# Patient Record
Sex: MALE | Race: BLACK OR AFRICAN AMERICAN | NOT HISPANIC OR LATINO | Employment: FULL TIME | URBAN - METROPOLITAN AREA
[De-identification: names, ages, dates, MRNs, and addresses within clinical notes are randomized per-mention and may not be internally consistent; named-entity substitution may affect disease eponyms.]

---

## 2019-04-16 ENCOUNTER — HOSPITAL ENCOUNTER (EMERGENCY)
Facility: HOSPITAL | Age: 59
Discharge: HOME | End: 2019-04-16
Attending: EMERGENCY MEDICINE
Payer: COMMERCIAL

## 2019-04-16 VITALS
OXYGEN SATURATION: 98 % | RESPIRATION RATE: 14 BRPM | BODY MASS INDEX: 23.11 KG/M2 | DIASTOLIC BLOOD PRESSURE: 83 MMHG | WEIGHT: 174.38 LBS | SYSTOLIC BLOOD PRESSURE: 136 MMHG | HEIGHT: 73 IN | TEMPERATURE: 98 F | HEART RATE: 51 BPM

## 2019-04-16 DIAGNOSIS — L03.115 CELLULITIS OF RIGHT LOWER LEG: Primary | ICD-10-CM

## 2019-04-16 DIAGNOSIS — M79.661 PAIN OF RIGHT LOWER LEG: ICD-10-CM

## 2019-04-16 PROCEDURE — 99284 EMERGENCY DEPT VISIT MOD MDM: CPT

## 2019-04-16 RX ORDER — DOXYCYCLINE 100 MG/1
100 CAPSULE ORAL 2 TIMES DAILY
Qty: 20 CAPSULE | Refills: 0 | Status: SHIPPED | OUTPATIENT
Start: 2019-04-16 | End: 2019-04-26

## 2019-04-16 NOTE — ED PROVIDER NOTES
SCRIBE #1 NOTE: I, Corinne Mack, am scribing for, and in the presence of, Jose Samuel NP. I have scribed the entire note.      History      Chief Complaint   Patient presents with    Leg Swelling     pt c/o localized swelling and redness to his Rt lower leg that has been going on for a month, pt has been getting treated for cellulitis       Review of patient's allergies indicates:  No Known Allergies     HPI   HPI    4/16/2019, 6:28 PM   History obtained from the patient      History of Present Illness: Hector Corona is a 58 y.o. male patient with PMHx of HTN who presents to the Emergency Department for R lower leg swelling which onset gradually a month ago. Pt reports he is being Tx for a RLE cellulitis; pt has been on clindamycin for 3 weeks. Pt states his RLE swelling has not resolved. Symptoms are constant and moderate in severity. No mitigating or exacerbating factors reported. Associated sxs include R lower leg redness and pain. Patient denies any fever, chills, CP, SOB, N/V/D, abd pain, back pain, neck pain, HA, dizziness, and all other sxs at this time. No other prior Tx reported. No further complaints or concerns at this time.         Arrival mode: Personal vehicle    PCP: [facility]       Past Medical History:  Past Medical History:   Diagnosis Date    Hypertension        Past Surgical History:  History reviewed. No pertinent surgical history.      Family History:  Family History   Problem Relation Age of Onset    Pneumonia Mother     Colon cancer Father         80       Social History:  Social History     Tobacco Use    Smoking status: Never Smoker    Smokeless tobacco: Never Used   Substance and Sexual Activity    Alcohol use: Yes     Alcohol/week: 2.4 oz     Types: 4 Cans of beer per week    Drug use: No    Sexual activity: Yes     Partners: Female     Birth control/protection: Condom       ROS   Review of Systems   Constitutional: Negative for chills and fever.   Respiratory:  "Negative for cough and shortness of breath.    Cardiovascular: Positive for leg swelling (R lower leg). Negative for chest pain.   Gastrointestinal: Negative for abdominal pain, diarrhea, nausea and vomiting.   Musculoskeletal: Negative for back pain, neck pain and neck stiffness.        (+) R lower leg pain   Skin: Negative for rash and wound.        (+) R lower leg redness   Neurological: Negative for dizziness, light-headedness, numbness and headaches.   All other systems reviewed and are negative.    Physical Exam      Initial Vitals [04/16/19 1720]   BP Pulse Resp Temp SpO2   136/83 (!) 51 14 98 °F (36.7 °C) 98 %      MAP       --          Physical Exam  Nursing Notes and Vital Signs Reviewed.  Constitutional: Patient is in no acute distress. Well-developed and well-nourished.  Head: Atraumatic. Normocephalic.  Eyes: PERRL. EOM intact. Conjunctivae are not pale. No scleral icterus.  ENT: Mucous membranes are moist. Oropharynx is clear and symmetric.    Neck: Supple. Full ROM. No lymphadenopathy.  Cardiovascular: Regular rate. Regular rhythm. No murmurs, rubs, or gallops. Distal pulses are 2+ and symmetric.  Pulmonary/Chest: No respiratory distress. Clear to auscultation bilaterally. No wheezing or rales.  Abdominal: Soft and non-distended.  There is no tenderness.  No rebound, guarding, or rigidity.   Musculoskeletal: Moves all extremities. No obvious deformities. No edema. Redness to the medial R lower leg with TTP.  Skin: Warm and dry.  Neurological:  Alert, awake, and appropriate.  Normal speech.  No acute focal neurological deficits are appreciated.  Psychiatric: Normal affect. Good eye contact. Appropriate in content.    ED Course    Procedures  ED Vital Signs:  Vitals:    04/16/19 1720   BP: 136/83   Pulse: (!) 51   Resp: 14   Temp: 98 °F (36.7 °C)   TempSrc: Oral   SpO2: 98%   Weight: 79.1 kg (174 lb 6.1 oz)   Height: 6' 1" (1.854 m)       Abnormal Lab Results:  Labs Reviewed - No data to display     All " Lab Results:  Results for orders placed or performed in visit on 03/10/08   Basic metabolic panel   Result Value Ref Range    BUN, Bld 16 5 - 23 mg/dl    Sodium 142 136 - 145 mMol/l    Potassium 4.0 3.3 - 5.3 mMol/l    Chloride 103 95 - 110 mMol/l    CO2 29 23.0 - 29.0 mEq/L    Glucose 92 70 - 110 mg/dl    Creatinine 1.3 0.5 - 1.4 mg/dl    Calcium 9.4 8.7 - 10.5 mg/dl   Lipid panel   Result Value Ref Range    Cholesterol 174 120 - 199 mg/dL    Triglycerides 74 30 - 150 mg/dL    HDL 53 40 - 64 mg/dL    LDL Cholesterol 106.2 63 - 129 mg/dl    HDL/Chol Ratio 30.5 20 - 50 %    Total Cholesterol/HDL Ratio 3.3 2.0 - 5.0   AST (SGOT)   Result Value Ref Range    AST 15 0 - 37 U/L   ALT (SGPT)   Result Value Ref Range    ALT 13 0 - 40 U/L   TSH   Result Value Ref Range    TSH 2.7 0.4 - 4.0 uIU/ml   PSA, Screening   Result Value Ref Range    PSA, SCREEN 1.5 0 - 4.0 ng/ml       Imaging Results:  Imaging Results          US Lower Extremity Veins Right (Final result)  Result time 04/16/19 20:22:55    Final result by Malcom Sandoval Jr., MD (04/16/19 20:22:55)                 Impression:      Negative for <right>lower extremity DVT.      Electronically signed by: Malcom Sandoval Jr., MD  Date:    04/16/2019  Time:    20:22             Narrative:    EXAMINATION:  US LOWER EXTREMITY VEINS RIGHT    CLINICAL HISTORY:  Pain in right lower leg    FINDINGS:  Grayscale and color Doppler sonography was formed through the <right> lower extremity    The <right>lower extremity veins are widely patent with normal augmentation, and compressibility and respiratory variability.                                        The Emergency Provider reviewed the vital signs and test results, which are outlined above.    ED Discussion     8:21 PM: Reassessed pt at this time. Pt is awake, alert, and in no distress. Discussed with pt all pertinent ED information and results. Discussed pt dx and plan of tx. Gave pt all f/u and return to the ED instructions. All  questions and concerns were addressed at this time. Pt expresses understanding of information and instructions, and is comfortable with plan to discharge. Pt is stable for discharge.    I discussed with patient and/or family/caretaker that evaluation in the ED does not suggest any emergent or life threatening medical conditions requiring immediate intervention beyond what was provided in the ED, and I believe patient is safe for discharge.  Regardless, an unremarkable evaluation in the ED does not preclude the development or presence of a serious of life threatening condition. As such, patient was instructed to return immediately for any worsening or change in current symptoms.      ED Medication(s):  Medications - No data to display       Medication List      START taking these medications    doxycycline 100 MG Cap  Commonly known as:  VIBRAMYCIN  Take 1 capsule (100 mg total) by mouth 2 (two) times daily. for 10 days        ASK your doctor about these medications    amLODIPine 10 MG tablet  Commonly known as:  NORVASC     docusate sodium 100 MG capsule  Commonly known as:  COLACE     losartan 100 MG tablet  Commonly known as:  COZAAR     nabumetone 500 MG tablet  Commonly known as:  RELAFEN  Take 1 tablet (500 mg total) by mouth once daily.     sodium chloride 5 % ophthalmic ointment  Commonly known as:  ADAMA 128     SUPREP BOWEL PREP KIT 17.5-3.13-1.6 gram Solr  Generic drug:  sodium,potassium,mag sulfates  Use as directed           Where to Get Your Medications      You can get these medications from any pharmacy    Bring a paper prescription for each of these medications  · doxycycline 100 MG Cap         Follow-up Information     Schedule an appointment as soon as possible for a visit  with [facility].    Contact information:  [address] [address] [phone]             [facility] - .    Specialty:  Emergency Medicine  Why:  As needed, If symptoms  worsen  Contact information:  [address] [address] [phone]                   Medical Decision Making    Medical Decision Making:   Clinical Tests:   Lab Tests: Ordered  Radiological Study: Ordered and Reviewed           Scribe Attestation:   Scribe #1: I performed the above scribed service and the documentation accurately describes the services I performed. I attest to the accuracy of the note 04/16/2019.    Attending:   Physician Attestation Statement for Scribe #1: I, Jose Samuel NP, personally performed the services described in this documentation, as scribed by Corinne Mack, in my presence, and it is both accurate and complete.          Clinical Impression       ICD-10-CM ICD-9-CM   1. Cellulitis of right lower leg L03.115 682.6   2. Pain of right lower leg M79.661 729.5       Disposition:   Disposition: Discharged  Condition: Stable           Jose Samuel NP  04/17/19 0118

## 2019-04-17 NOTE — ED NOTES
Assumed care from J Carlos RN @1910, patient sitting up on side of bed talking on cell phone, GHULAM, patient con;t cphone conversation and did not acknowledge my presence when introducing myself to him, will con't to monitor

## 2020-07-06 ENCOUNTER — LAB VISIT (OUTPATIENT)
Dept: LAB | Facility: HOSPITAL | Age: 60
End: 2020-07-06
Attending: UROLOGY
Payer: COMMERCIAL

## 2020-07-06 ENCOUNTER — OFFICE VISIT (OUTPATIENT)
Dept: UROLOGY | Facility: CLINIC | Age: 60
End: 2020-07-06
Payer: OTHER GOVERNMENT

## 2020-07-06 VITALS
SYSTOLIC BLOOD PRESSURE: 126 MMHG | BODY MASS INDEX: 26.41 KG/M2 | DIASTOLIC BLOOD PRESSURE: 88 MMHG | WEIGHT: 200.19 LBS | TEMPERATURE: 98 F

## 2020-07-06 DIAGNOSIS — R97.20 ELEVATED PSA: ICD-10-CM

## 2020-07-06 DIAGNOSIS — N42.89 PROSTATE MASS: ICD-10-CM

## 2020-07-06 DIAGNOSIS — R97.20 ELEVATED PSA: Primary | ICD-10-CM

## 2020-07-06 PROCEDURE — 36415 COLL VENOUS BLD VENIPUNCTURE: CPT

## 2020-07-06 PROCEDURE — 99999 PR PBB SHADOW E&M-EST. PATIENT-LVL I: CPT | Mod: PBBFAC,,, | Performed by: UROLOGY

## 2020-07-06 PROCEDURE — 99211 OFF/OP EST MAY X REQ PHY/QHP: CPT | Mod: PBBFAC | Performed by: UROLOGY

## 2020-07-06 PROCEDURE — 99204 OFFICE O/P NEW MOD 45 MIN: CPT | Mod: S$GLB,,, | Performed by: UROLOGY

## 2020-07-06 PROCEDURE — 99204 PR OFFICE/OUTPT VISIT, NEW, LEVL IV, 45-59 MIN: ICD-10-PCS | Mod: S$GLB,,, | Performed by: UROLOGY

## 2020-07-06 PROCEDURE — 84153 ASSAY OF PSA TOTAL: CPT

## 2020-07-06 PROCEDURE — 99999 PR PBB SHADOW E&M-EST. PATIENT-LVL I: ICD-10-PCS | Mod: PBBFAC,,, | Performed by: UROLOGY

## 2020-07-06 RX ORDER — CIPROFLOXACIN 500 MG/1
500 TABLET ORAL EVERY 12 HOURS
Qty: 3 TABLET | Refills: 0 | Status: SHIPPED | OUTPATIENT
Start: 2020-07-06

## 2020-07-06 NOTE — PROGRESS NOTES
Chief Complaint: Elevated PSA    HPI:   7/6/20: 60 yo man [facility].  Says it was in the 5-7 range no record otherwise.  MRI was done and that record is available showing a 0.9cm lesion in the right anterior TZ PIRADs 3. No abd/pelvic pain and no exac/rel factors.  No hematuria.  No urolithiasis.  No urinary bother.  No  history.  Normal sexual function.    Allergies:  Patient has no known allergies.    Medications:  has a current medication list which includes the following prescription(s): amlodipine, docusate sodium, losartan, nabumetone, sodium chloride, and suprep bowel prep kit.    Review of Systems:  General: No fever, chills, fatigability, or weight loss.  Skin: No rashes, itching, or changes in color or texture of skin.  Chest: Denies FIORE, cyanosis, wheezing, cough, and sputum production.  Abdomen: Appetite fine. No weight loss. Denies diarrhea, abdominal pain, hematemesis, or blood in stool.  Musculoskeletal: No joint stiffness or swelling. Denies back pain.  : As above.  All other review of systems negative.    PMH:   has a past medical history of Hypertension.    PSH:   has no past surgical history on file.    FamHx: family history includes Colon cancer in his father; Pneumonia in his mother.    SocHx:  reports that he has never smoked. He has never used smokeless tobacco. He reports current alcohol use of about 4.0 standard drinks of alcohol per week. He reports that he does not use drugs.      Physical Exam:  There were no vitals filed for this visit.  General: A&Ox3, no apparent distress, no deformities  Neck: No masses, normal thyroid  Lungs: normal inspiration, no use of accessory muscles  Heart: normal pulse, no arrhythmias  Abdomen: Soft, NT, ND, no masses, no hernias, no hepatosplenomegaly  Lymphatic: Neck and groin nodes negative  Skin: The skin is warm and dry. No jaundice.  Ext: No c/c/e.  : defer to biopsy    Labs/Studies:   PSA    3/08:  1.5    Impression/Plan:   1. PSA today to reconfirm findings and establish local history.  Cipro/MRI/Bx; repeat MRI needed to restage to current situation and to facilitate Uronav guided biopsy locally.

## 2020-07-07 LAB — COMPLEXED PSA SERPL-MCNC: 4.6 NG/ML (ref 0–4)

## 2020-07-17 ENCOUNTER — LAB VISIT (OUTPATIENT)
Dept: LAB | Facility: HOSPITAL | Age: 60
End: 2020-07-17
Attending: UROLOGY
Payer: COMMERCIAL

## 2020-07-17 ENCOUNTER — HOSPITAL ENCOUNTER (OUTPATIENT)
Dept: RADIOLOGY | Facility: HOSPITAL | Age: 60
Discharge: HOME | End: 2020-07-17
Attending: UROLOGY
Payer: COMMERCIAL

## 2020-07-17 DIAGNOSIS — N42.89 PROSTATE MASS: ICD-10-CM

## 2020-07-17 DIAGNOSIS — R97.20 ELEVATED PSA: ICD-10-CM

## 2020-07-17 LAB
CREAT SERPL-MCNC: 1.3 MG/DL (ref 0.5–1.4)
EST. GFR  (AFRICAN AMERICAN): >60 ML/MIN/1.73 M^2
EST. GFR  (NON AFRICAN AMERICAN): 60 ML/MIN/1.73 M^2

## 2020-07-17 PROCEDURE — 25500020 PHARM REV CODE 255: Performed by: UROLOGY

## 2020-07-17 PROCEDURE — 72197 MRI PROSTATE W W/O CONTRAST: ICD-10-PCS | Mod: 26,,, | Performed by: RADIOLOGY

## 2020-07-17 PROCEDURE — A9585 GADOBUTROL INJECTION: HCPCS | Performed by: UROLOGY

## 2020-07-17 PROCEDURE — 82565 ASSAY OF CREATININE: CPT

## 2020-07-17 PROCEDURE — 72197 MRI PELVIS W/O & W/DYE: CPT | Mod: TC

## 2020-07-17 PROCEDURE — 36415 COLL VENOUS BLD VENIPUNCTURE: CPT

## 2020-07-17 PROCEDURE — 72197 MRI PELVIS W/O & W/DYE: CPT | Mod: 26,,, | Performed by: RADIOLOGY

## 2020-07-17 RX ORDER — GADOBUTROL 604.72 MG/ML
9 INJECTION INTRAVENOUS
Status: COMPLETED | OUTPATIENT
Start: 2020-07-17 | End: 2020-07-17

## 2020-07-17 RX ADMIN — GADOBUTROL 9 ML: 604.72 INJECTION INTRAVENOUS at 03:07

## 2020-07-27 ENCOUNTER — OFFICE VISIT (OUTPATIENT)
Dept: UROLOGY | Facility: CLINIC | Age: 60
End: 2020-07-27
Payer: COMMERCIAL

## 2020-07-27 VITALS
WEIGHT: 200.19 LBS | HEIGHT: 73 IN | HEART RATE: 47 BPM | DIASTOLIC BLOOD PRESSURE: 82 MMHG | SYSTOLIC BLOOD PRESSURE: 138 MMHG | BODY MASS INDEX: 26.53 KG/M2

## 2020-07-27 DIAGNOSIS — R97.20 ELEVATED PSA: Primary | ICD-10-CM

## 2020-07-27 LAB
BILIRUB SERPL-MCNC: NORMAL MG/DL
BLOOD URINE, POC: NORMAL
CLARITY, POC UA: NORMAL
COLOR, POC UA: YELLOW
GLUCOSE UR QL STRIP: NORMAL
KETONES UR QL STRIP: NORMAL
LEUKOCYTE ESTERASE URINE, POC: NORMAL
NITRITE, POC UA: NORMAL
PH, POC UA: 5
PROTEIN, POC: NORMAL
SPECIFIC GRAVITY, POC UA: 1.01
UROBILINOGEN, POC UA: NORMAL

## 2020-07-27 PROCEDURE — 76872 PR US TRANSRECTAL: ICD-10-PCS | Mod: 26,S$GLB,, | Performed by: UROLOGY

## 2020-07-27 PROCEDURE — 55700 PR BIOPSY OF PROSTATE,NEEDLE/PUNCH: CPT | Mod: S$GLB,,, | Performed by: UROLOGY

## 2020-07-27 PROCEDURE — 55700 PR BIOPSY OF PROSTATE,NEEDLE/PUNCH: ICD-10-PCS | Mod: S$GLB,,, | Performed by: UROLOGY

## 2020-07-27 PROCEDURE — 99999 PR PBB SHADOW E&M-EST. PATIENT-LVL III: CPT | Mod: PBBFAC,,, | Performed by: UROLOGY

## 2020-07-27 PROCEDURE — 99499 UNLISTED E&M SERVICE: CPT | Mod: S$GLB,,, | Performed by: UROLOGY

## 2020-07-27 PROCEDURE — 99999 PR PBB SHADOW E&M-EST. PATIENT-LVL III: ICD-10-PCS | Mod: PBBFAC,,, | Performed by: UROLOGY

## 2020-07-27 PROCEDURE — 88305 TISSUE EXAM BY PATHOLOGIST: CPT | Mod: 59 | Performed by: PATHOLOGY

## 2020-07-27 PROCEDURE — 99499 NO LOS: ICD-10-PCS | Mod: S$GLB,,, | Performed by: UROLOGY

## 2020-07-27 PROCEDURE — 81002 POCT URINE DIPSTICK WITHOUT MICROSCOPE: ICD-10-PCS | Mod: S$GLB,,, | Performed by: UROLOGY

## 2020-07-27 PROCEDURE — 88305 TISSUE EXAM BY PATHOLOGIST: ICD-10-PCS | Mod: 26,,, | Performed by: PATHOLOGY

## 2020-07-27 PROCEDURE — 76872 US TRANSRECTAL: CPT | Mod: 26,S$GLB,, | Performed by: UROLOGY

## 2020-07-27 PROCEDURE — 81002 URINALYSIS NONAUTO W/O SCOPE: CPT | Mod: S$GLB,,, | Performed by: UROLOGY

## 2020-07-27 PROCEDURE — 88305 TISSUE EXAM BY PATHOLOGIST: CPT | Mod: 26,,, | Performed by: PATHOLOGY

## 2020-07-27 NOTE — PROGRESS NOTES
Chief Complaint: Elevated PSA    HPI:   7/27/20: TRUS/Bx 33 gm today.  MRI PIRADs2.  7/6/20: 60 yo man [facility].  Says it was in the 5-7 range no record otherwise.  MRI was done and that record is available showing a 0.9cm lesion in the right anterior TZ PIRADs 3. No abd/pelvic pain and no exac/rel factors.  No hematuria.  No urolithiasis.  No urinary bother.  No  history.  Normal sexual function.    Allergies:  Patient has no known allergies.    Medications:  has a current medication list which includes the following prescription(s): amlodipine, ciprofloxacin hcl, docusate sodium, losartan, nabumetone, sodium chloride, and suprep bowel prep kit.    Review of Systems:  General: No fever, chills, fatigability, or weight loss.  Skin: No rashes, itching, or changes in color or texture of skin.  Chest: Denies FIORE, cyanosis, wheezing, cough, and sputum production.  Abdomen: Appetite fine. No weight loss. Denies diarrhea, abdominal pain, hematemesis, or blood in stool.  Musculoskeletal: No joint stiffness or swelling. Denies back pain.  : As above.  All other review of systems negative.    PMH:   has a past medical history of Hypertension.    PSH:   has no past surgical history on file.    FamHx: family history includes Colon cancer in his father; Pneumonia in his mother.    SocHx:  reports that he has never smoked. He has never used smokeless tobacco. He reports current alcohol use of about 4.0 standard drinks of alcohol per week. He reports that he does not use drugs.      Physical Exam:  Vitals:    07/27/20 1332   BP: 138/82   Pulse: (!) 47     General: A&Ox3, no apparent distress, no deformities  Neck: No masses, normal thyroid  Lungs: normal inspiration, no use of accessory muscles  Heart: normal pulse, no arrhythmias  Abdomen: Soft, NT, ND  Skin: The skin is warm and dry. No jaundice.  Ext: No c/c/e.  : defer to biopsy    Labs/Studies:   PSA    3/08: 1.5    7/20:  4.6    Procedure: (1) Transrectal Prostate Biopsy                      (2) Transrectal ultrasound of prostate                     (3) Ultrasound Guidance of Prostate Biopsy needles    Detail: After proper consents were obtained, the patient was prepped and draped in normal fashion in the left lateral decubitus position for TRUS/Bx.  5 ml of lidocaine jelly was instilled in the rectum.  The U/S with rectal probe was used to size the prostate.  A spinal needle was used and 5ml of 1% lidocaine was instilled on the side of the prostate at the base of the seminal vesicles.  Biopsy was then performed using an 18Ga biopsy needle directed at the base, mid and apex bilaterally for a total of 12 cores. Antibiotic prophylaxis was provided using oral ciprofloxacin.    Findings: The prostate is 50W, 30H, and 42L for volume of 33 grams, with no abnl appred.    Impression/Plan:   1. RTC 10d to discuss results.

## 2020-07-28 ENCOUNTER — TELEPHONE (OUTPATIENT)
Dept: UROLOGY | Facility: CLINIC | Age: 60
End: 2020-07-28

## 2020-07-28 NOTE — TELEPHONE ENCOUNTER
Pt is wanting a work excuse for yesterday and today.  He had a prostate biopsy done yesterday.  I will leave his letter at the check in desk on 2nd floor.  Voices understanding

## 2020-07-28 NOTE — TELEPHONE ENCOUNTER
----- Message from Shirley Shah sent at 7/28/2020  8:09 AM CDT -----  Regarding: Excuse  Contact: self- [phone]  Would like to consult with the nurse, patient was seen in the office, patient states that he needs a Dr excuse for Work, patient would like to speak with the nurse concerning this, please call back at  [phone], Thanks sj

## 2020-07-30 LAB
FINAL PATHOLOGIC DIAGNOSIS: NORMAL
GROSS: NORMAL

## 2020-08-10 ENCOUNTER — OFFICE VISIT (OUTPATIENT)
Dept: UROLOGY | Facility: CLINIC | Age: 60
End: 2020-08-10
Payer: COMMERCIAL

## 2020-08-10 VITALS
TEMPERATURE: 99 F | HEIGHT: 73 IN | BODY MASS INDEX: 26.76 KG/M2 | WEIGHT: 201.94 LBS | DIASTOLIC BLOOD PRESSURE: 82 MMHG | SYSTOLIC BLOOD PRESSURE: 124 MMHG

## 2020-08-10 DIAGNOSIS — C61 PROSTATE CANCER: Primary | ICD-10-CM

## 2020-08-10 PROCEDURE — 3008F BODY MASS INDEX DOCD: CPT | Mod: CPTII,S$GLB,, | Performed by: UROLOGY

## 2020-08-10 PROCEDURE — 3008F PR BODY MASS INDEX (BMI) DOCUMENTED: ICD-10-PCS | Mod: CPTII,S$GLB,, | Performed by: UROLOGY

## 2020-08-10 PROCEDURE — 99999 PR PBB SHADOW E&M-EST. PATIENT-LVL III: ICD-10-PCS | Mod: PBBFAC,,, | Performed by: UROLOGY

## 2020-08-10 PROCEDURE — 99213 PR OFFICE/OUTPT VISIT, EST, LEVL III, 20-29 MIN: ICD-10-PCS | Mod: S$GLB,,, | Performed by: UROLOGY

## 2020-08-10 PROCEDURE — 99213 OFFICE O/P EST LOW 20 MIN: CPT | Mod: S$GLB,,, | Performed by: UROLOGY

## 2020-08-10 PROCEDURE — 99999 PR PBB SHADOW E&M-EST. PATIENT-LVL III: CPT | Mod: PBBFAC,,, | Performed by: UROLOGY

## 2020-08-10 NOTE — PROGRESS NOTES
Chief Complaint: Prostate cancer    HPI:   8/10/20: CaP confirmed but low risk, Gl 3+3=6 in 5% of left mid one sample.  7/27/20: TRUS/Bx 33 gm today.  MRI PIRADs2.  7/6/20: 60 yo man [facility].  Says it was in the 5-7 range no record otherwise.  MRI was done and that record is available showing a 0.9cm lesion in the right anterior TZ PIRADs 3. No abd/pelvic pain and no exac/rel factors.  No hematuria.  No urolithiasis.  No urinary bother.  No  history.  Normal sexual function.    Allergies:  Patient has no known allergies.    Medications:  has a current medication list which includes the following prescription(s): amlodipine, docusate sodium, losartan, nabumetone, ciprofloxacin hcl, sodium chloride, and suprep bowel prep kit.    Review of Systems:  General: No fever, chills, fatigability, or weight loss.  Skin: No rashes, itching, or changes in color or texture of skin.  Chest: Denies FIORE, cyanosis, wheezing, cough, and sputum production.  Abdomen: Appetite fine. No weight loss. Denies diarrhea, abdominal pain, hematemesis, or blood in stool.  Musculoskeletal: No joint stiffness or swelling. Denies back pain.  : As above.  All other review of systems negative.    PMH:   has a past medical history of Hypertension.    PSH:   has no past surgical history on file.    FamHx: family history includes Colon cancer in his father; Pneumonia in his mother.    SocHx:  reports that he has never smoked. He has never used smokeless tobacco. He reports current alcohol use of about 4.0 standard drinks of alcohol per week. He reports that he does not use drugs.      Physical Exam:  Vitals:    08/10/20 1605   BP: 124/82   Temp: 98.6 °F (37 °C)     General: A&Ox3, no apparent distress, no deformities  Neck: No masses, normal thyroid  Lungs: normal inspiration, no use of accessory muscles  Heart: normal pulse, no arrhythmias  Abdomen: Soft, NT, ND  Skin: The skin is warm and dry. No  jaundice.  Ext: No c/c/e.  : defer to biopsy    Labs/Studies:   PSA    3/08: 1.5    7/20: 4.6    Impression/Plan:   1. Active surveillance recc.  PSA 3/6 with RTC 6 mo

## 2021-02-10 ENCOUNTER — LAB VISIT (OUTPATIENT)
Dept: LAB | Facility: HOSPITAL | Age: 61
End: 2021-02-10
Attending: UROLOGY
Payer: COMMERCIAL

## 2021-02-10 DIAGNOSIS — C61 PROSTATE CANCER: ICD-10-CM

## 2021-02-10 LAB — COMPLEXED PSA SERPL-MCNC: 8 NG/ML (ref 0–4)

## 2021-02-10 PROCEDURE — 84153 ASSAY OF PSA TOTAL: CPT

## 2021-02-10 PROCEDURE — 36415 COLL VENOUS BLD VENIPUNCTURE: CPT

## 2021-02-14 ENCOUNTER — TELEPHONE (OUTPATIENT)
Dept: UROLOGY | Facility: CLINIC | Age: 61
End: 2021-02-14

## 2021-02-22 ENCOUNTER — TELEPHONE (OUTPATIENT)
Dept: UROLOGY | Facility: CLINIC | Age: 61
End: 2021-02-22